# Patient Record
Sex: MALE | Race: WHITE
[De-identification: names, ages, dates, MRNs, and addresses within clinical notes are randomized per-mention and may not be internally consistent; named-entity substitution may affect disease eponyms.]

---

## 2017-10-20 ENCOUNTER — HOSPITAL ENCOUNTER (OUTPATIENT)
Dept: HOSPITAL 92 - MRI | Age: 62
Discharge: HOME | End: 2017-10-20
Attending: NEUROLOGICAL SURGERY
Payer: COMMERCIAL

## 2017-10-20 DIAGNOSIS — C71.9: Primary | ICD-10-CM

## 2017-10-20 PROCEDURE — 70553 MRI BRAIN STEM W/O & W/DYE: CPT

## 2017-10-20 PROCEDURE — A9579 GAD-BASE MR CONTRAST NOS,1ML: HCPCS

## 2017-10-20 NOTE — MRI
BRAIN MRI WITH AND WITHOUT CONTRAST:

 

COMPARISON: 

9/20/17.

 

CLINICAL HISTORY: 

Brain lab protocol.

 

FINDINGS: 

Redemonstration of pathologic enhancement involving the medial right temporal lobe measuring 2.2 cm 
AP x 1.7 cm transverse.

 

Developmental venous anomaly of the right parietooccipital region present.

 

Cavitary encephalomalacia of the anterior pole right temporal lobe is redemonstrated with adjacent d
ural-based enhancement, predominantly medially.  A component of this linear enhancement may be relat
ed to postoperative enhancement.

 

IMPRESSION: 

Pathologic enhancement, intraaxial in location involving the medial right temporal lobe compatible w
ith malignancy.  This does reside adjacent postoperative resection cavity of the anterior pole right
 temporal lobe.

 

POS: LANE

## 2018-06-27 ENCOUNTER — HOSPITAL ENCOUNTER (OUTPATIENT)
Dept: HOSPITAL 92 - SCSMRI | Age: 63
Discharge: HOME | End: 2018-06-27
Attending: INTERNAL MEDICINE
Payer: COMMERCIAL

## 2018-06-27 DIAGNOSIS — C71.2: Primary | ICD-10-CM

## 2018-06-27 PROCEDURE — 70553 MRI BRAIN STEM W/O & W/DYE: CPT

## 2018-06-27 PROCEDURE — A9579 GAD-BASE MR CONTRAST NOS,1ML: HCPCS

## 2018-06-27 NOTE — MRI
BRAIN MRI WITH AND WITHOUT CONTRAST:

 

HISTORY:

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  Six month followup.

 

COMPARISON:

Brain MRI from [facility] on 02/20/2018 and brain MRI from [facility] fro
m 10/20/2017 and 09/20/2017.

 

TECHNIQUE:

A brain MRI is performed with and without intravenous Gadolinium administration.  Multisequential, mu
ltiplanar imaging is performed.

 

FINDINGS:

Axial gradient echo sequence demonstrates hemosiderin deposition at the resection site, compatible wi
th remote blood products.  Acute hemorrhage is not appreciated.  There are stable post surgical hooker
es involving the right calvarium.  Redemonstration of fluid signal intensity just deep to the calvari
al resection, compatible with nonspecific postoperative extraaxial fluid.  There is redemonstration o
f malacic and gliotic change involving the right temporal lobe.  There is redemonstration of T2 and F
LAIR hyperintensities involving the right femoral, temporal, and parietal white matter.  Vasogenic ed
ema as well as components of gliotic change are suspected.  There is no midline shift.  No evidence o
f obstructing hydrocephalus.

 

The left cerebrum is unremarkable.

 

Post-contrast images demonstrate extensive dural enhancement at the operative site.  Additional dural
 enhancement extends along the right frontal, temporal, and parietal convexities.  The degree of dura
l enhancement has not significantly changed when compared to the outside examination performed at [facility].  There is increased enhancement involving the medial right temporal lobe.  The largest focu
s of enhancement is along the posterior-medial aspect of the right temporal lobe and abuts the right 
aspect of the marshall.  Though there is mass effect upon the right aspect of the marshall, there is no evide
nce of abnormal enhancement within the marshall.  This focus of enhancement measures 2.9 x 3.3 cm.  From 
the outside study performed at [facility], this region of enhancement measured 2 x 1.4 cm.  There is
 no evidence of mass effect upon the right aspect of the marshall at that time.  An additional smaller ar
ea of enhancement is noted along the anterior-medial aspect of the right temporal lobe.  This area of
 enhancement is also increased in size and currently measures 1.3 x 1.4 cm (previously measuring 1.1 
x 1.1 cm).

 

There is a stable developmental venous anomaly in the right occipital lobe.

 

IMPRESSION:

1.  Enlarging, enhancing masses involving the right temporal lobe, with resultant mass effect upon th
e right marshall.

 

2.  Areas of enhancement involving the dura, compatible with post surgical change/scar.

 

POS: [facility]

## 2018-08-08 ENCOUNTER — HOSPITAL ENCOUNTER (OUTPATIENT)
Dept: HOSPITAL 92 - MRI | Age: 63
Discharge: HOME | End: 2018-08-08
Attending: INTERNAL MEDICINE
Payer: COMMERCIAL

## 2018-08-08 DIAGNOSIS — C71.2: Primary | ICD-10-CM

## 2018-08-08 DIAGNOSIS — Z98.890: ICD-10-CM

## 2018-08-08 PROCEDURE — 70553 MRI BRAIN STEM W/O & W/DYE: CPT

## 2018-08-08 NOTE — MRI
PRE AND POST CONTRAST ENHANCED MRI BRAIN:

 

08/08/2018

 

HISTORY:

Brain tumor.  The patient received chemotherapy and radiation therapy followup.

 

COMPARISON:

06/27/2018

 

TECHNIQUE:

Pre and post contrast enhanced MRI of the brain were obtained.

 

FINDINGS:

Images demonstrate a previous right-sided pterional craniotomy.  There has been surgical resection of
 much of the right temporal lobe.

 

Areas of signal abnormality remain in the medial and posterior aspect of the right temporal lobe, com
patible with residual tumor.  There is definite enhancement in this area.  Overall, the MRI appearanc
e is not significantly changed.  A dural based area of enhancement remains and extends into the right
 middle cranial fossa, medially, compatible with dural based residual tumor.  Some encephalomalacic c
hanges are also seen in the residual portions of the upper right temporal lobe.  Signal abnormality a
lso remains in the deep and periventricular white matter of the right frontal and parietal lobes.

 

No definite evidence of contralateral extension is seen.

 

IMPRESSION:

Extensive surgical changes seen in the right middle cranial fossa with extensive residual neoplastic 
enhancement remaining.  MRI appearance is stable.

 

POS: [facility]

## 2018-09-20 ENCOUNTER — HOSPITAL ENCOUNTER (INPATIENT)
Dept: HOSPITAL 92 - ERS | Age: 63
LOS: 7 days | Discharge: NURSING, REHAB OR LONG-TERM CARE | DRG: 696 | End: 2018-09-27
Attending: FAMILY MEDICINE | Admitting: FAMILY MEDICINE
Payer: COMMERCIAL

## 2018-09-20 VITALS — BODY MASS INDEX: 30 KG/M2

## 2018-09-20 DIAGNOSIS — C71.9: ICD-10-CM

## 2018-09-20 DIAGNOSIS — Z92.21: ICD-10-CM

## 2018-09-20 DIAGNOSIS — K58.1: ICD-10-CM

## 2018-09-20 DIAGNOSIS — N17.9: ICD-10-CM

## 2018-09-20 DIAGNOSIS — E87.1: ICD-10-CM

## 2018-09-20 DIAGNOSIS — Z79.899: ICD-10-CM

## 2018-09-20 DIAGNOSIS — E03.9: ICD-10-CM

## 2018-09-20 DIAGNOSIS — K56.7: ICD-10-CM

## 2018-09-20 DIAGNOSIS — R53.1: ICD-10-CM

## 2018-09-20 DIAGNOSIS — I10: ICD-10-CM

## 2018-09-20 DIAGNOSIS — E78.5: ICD-10-CM

## 2018-09-20 DIAGNOSIS — Z92.3: ICD-10-CM

## 2018-09-20 DIAGNOSIS — Z66: ICD-10-CM

## 2018-09-20 DIAGNOSIS — R33.9: Primary | ICD-10-CM

## 2018-09-20 LAB
ALBUMIN SERPL BCG-MCNC: 4 G/DL (ref 3.4–4.8)
ALP SERPL-CCNC: 50 U/L (ref 40–150)
ALT SERPL W P-5'-P-CCNC: 15 U/L (ref 8–55)
ANION GAP SERPL CALC-SCNC: 16 MMOL/L (ref 10–20)
AST SERPL-CCNC: 16 U/L (ref 5–34)
BASOPHILS # BLD AUTO: 0 THOU/UL (ref 0–0.2)
BASOPHILS NFR BLD AUTO: 0.2 % (ref 0–1)
BILIRUB SERPL-MCNC: 0.7 MG/DL (ref 0.2–1.2)
BUN SERPL-MCNC: 23 MG/DL (ref 8.4–25.7)
CALCIUM SERPL-MCNC: 9.6 MG/DL (ref 7.8–10.44)
CHLORIDE SERPL-SCNC: 105 MMOL/L (ref 98–107)
CO2 SERPL-SCNC: 21 MMOL/L (ref 23–31)
CREAT CL PREDICTED SERPL C-G-VRATE: 0 ML/MIN (ref 70–130)
EOSINOPHIL # BLD AUTO: 0.8 THOU/UL (ref 0–0.7)
EOSINOPHIL NFR BLD AUTO: 15 % (ref 0–10)
GLOBULIN SER CALC-MCNC: 2.5 G/DL (ref 2.4–3.5)
GLUCOSE SERPL-MCNC: 159 MG/DL (ref 80–115)
HGB BLD-MCNC: 14.6 G/DL (ref 14–18)
LYMPHOCYTES # BLD: 1.1 THOU/UL (ref 1.2–3.4)
LYMPHOCYTES NFR BLD AUTO: 20.8 % (ref 21–51)
MCH RBC QN AUTO: 30.6 PG (ref 27–31)
MCV RBC AUTO: 93.3 FL (ref 78–98)
MONOCYTES # BLD AUTO: 0.3 THOU/UL (ref 0.11–0.59)
MONOCYTES NFR BLD AUTO: 6.9 % (ref 0–10)
NEUTROPHILS # BLD AUTO: 2.9 THOU/UL (ref 1.4–6.5)
NEUTROPHILS NFR BLD AUTO: 57.2 % (ref 42–75)
PLATELET # BLD AUTO: 290 THOU/UL (ref 130–400)
POTASSIUM SERPL-SCNC: 3.6 MMOL/L (ref 3.5–5.1)
RBC # BLD AUTO: 4.78 MILL/UL (ref 4.7–6.1)
SODIUM SERPL-SCNC: 138 MMOL/L (ref 136–145)
SP GR UR STRIP: 1.02 (ref 1–1.04)
WBC # BLD AUTO: 5 THOU/UL (ref 4.8–10.8)

## 2018-09-20 PROCEDURE — 81003 URINALYSIS AUTO W/O SCOPE: CPT

## 2018-09-20 PROCEDURE — 84481 FREE ASSAY (FT-3): CPT

## 2018-09-20 PROCEDURE — 85025 COMPLETE CBC W/AUTO DIFF WBC: CPT

## 2018-09-20 PROCEDURE — 96360 HYDRATION IV INFUSION INIT: CPT

## 2018-09-20 PROCEDURE — 80053 COMPREHEN METABOLIC PANEL: CPT

## 2018-09-20 PROCEDURE — 36416 COLLJ CAPILLARY BLOOD SPEC: CPT

## 2018-09-20 PROCEDURE — 36415 COLL VENOUS BLD VENIPUNCTURE: CPT

## 2018-09-20 PROCEDURE — 93005 ELECTROCARDIOGRAM TRACING: CPT

## 2018-09-20 PROCEDURE — 84443 ASSAY THYROID STIM HORMONE: CPT

## 2018-09-20 PROCEDURE — 84439 ASSAY OF FREE THYROXINE: CPT

## 2018-09-20 PROCEDURE — 51702 INSERT TEMP BLADDER CATH: CPT

## 2018-09-20 PROCEDURE — 83605 ASSAY OF LACTIC ACID: CPT

## 2018-09-20 PROCEDURE — 80048 BASIC METABOLIC PNL TOTAL CA: CPT

## 2018-09-20 RX ADMIN — ACETAMINOPHEN AND CODEINE PHOSPHATE PRN TAB: 300; 30 TABLET ORAL at 09:47

## 2018-09-20 RX ADMIN — ASPIRIN SCH MG: 81 TABLET ORAL at 09:49

## 2018-09-20 RX ADMIN — ACETAMINOPHEN AND CODEINE PHOSPHATE PRN TAB: 300; 30 TABLET ORAL at 18:19

## 2018-09-20 NOTE — PDOC.EVN
Event Note





- Event Note


Event Note: 





S: Paged by nursing informing patient's wife wanted to speak with the primary 

team. Upon arrival to the room, patient's wife was confused regarding the plan 

of care and overwhelmed by the acute deterioration of the patient's condition. 

The patient and his wife state that the patient has had progressive weakness of 

his left side and she states she is no longer able to help him ambulate at 

home. They also state this is his first episode of urinary retention but that 

his outpatient oncologist states this could be a potential problem. His wife 

also states she has had difficulty arranging hospice care outpatient and she is 

very frustrated about it. I discussed the care plan with the patient and his 

wife at length. 





O: 4/5 strength left leg. reynoso bag in place draining clear urine 





A/P:


- VICTOR HUGO on CKD2- will start IV LR at 100 mL/hr and will recheck BMP tomorrow 

morning


- Glioblastoma multiforme- will have CM work on arranging outpatient hospice 

care and placement in and long term care facility. 


- Deconditioning 2/2 Glioblastoma- PT/OT to evaluate for placement


- Neurogenic bladder- consult urology for recommendations on self cath vs. 

suprapubic catheter placement

## 2018-09-20 NOTE — HP
HISTORY OF PRESENT ILLNESS:  I have discussed this case with Dr. Duval and agreed with his assessme
nt and plan.

 

Briefly, Mr. Cain is an unfortunate 63-year-old man with a several year history of glioblastoma st
atus post several craniotomies and chemotherapies.  Yesterday, he developed urinary retention and als
o could not rise from the floor.  EMS was called and he was subsequently brought to the ER where cath
eterization placement revealed 150 mL of urine.  He was admitted for further observation and treatmen
t.

 

PHYSICAL EXAMINATION:

GENERAL:  This morning, the patient seems slightly lethargic and slightly confused.  He is, however, 
in no acute distress.

VITAL SIGNS:  His blood pressure is 130/80, pulse rate is 63 and regular, respirations are 18, his ro
om air saturation is 96%.

EAR, NOSE AND THROAT:  scalp changes and skull changes consistent with previous craniotomies.

NECK:  Supple.

CARDIAC:  Heart rhythm regular, no gallop or murmur.

LUNGS:  Diminished, but clear without rales or wheezes.  No distress.

ABDOMEN:  Flat and soft.

 

LABORATORY DATA:  CBC:  White count is 5,000, hemoglobin 14.6 with hematocrit of 44.6.  Chemistries: 
 Sodium 138, potassium 3.6, chloride 105, bicarbonate 21, BUN 23, creatinine 1.37.  Urinalysis negati
ve for evidence of infection.

 

ASSESSMENT AND PLAN:  Acute urinary retention, this is likely a neurogenic bladder and for now, we wi
ll just leave a Nichols catheter in place.  The patient and his wife are in discussions about palliativ
e care and hospice.

## 2018-09-20 NOTE — PDOC.FPRHP
- History of Present Illness


Chief Complaint: urinary problems


History of Present Illness: 


Pt is a 64 yo M with pmh of glioblastoma s/p craniotomy x2 and several rounds 

of chemo and brain radiation. Pt presents for evaluation of urinary retention. 

Reports over last 3 weeks he has found progressive difficulty with urination. 

Pt reports urinary frequency and continued urgency after voiding. No 

incontinence. No dysuria or hematuria. Denies prostate problems in the past. Pt 

was hypotensive with SBP in 80s and 90s on presentation. Was given bolus IVF 

and pressures normalized. Pt reports being on increased doses of 

antihypertensives due to taking avastin chemo with htn side effect. He 

discontinued this med earlier in the month but continued his current htn meds.





Glioblastoma- Pt dx in march 2016 over last 2 years has had 2 craniotomies and 

several rounds of chemo and radiation with most recent being avastin. Earlier 

this month pt and wife decided to discontinue treatment and pursue palliative 

measures. They have not decided on a hospice yet.





Weakness- also complains of generalized weakness with concentration on the L. 

Reports onset was after several treatments of radiation to R side of brain for 

glioblastoma.





ED Course: 


Reynoso cath- 850ml dark urine, UA- negative, bolus IVF








- Allergies/Adverse Reactions


 Allergies











Allergy/AdvReac Type Severity Reaction Status Date / Time


 


No Known Drug Allergies Allergy   Verified 03/15/16 03:16














- Home Medications


 











 Medication  Instructions  Recorded  Confirmed  Type


 


Atorvastatin Calcium [Lipitor] 10 mg PO DAILY 03/15/16 09/20/18 History


 


Levothyroxine Sodium 150 mcg PO DAILY 03/15/16 03/15/16 History


 


Lisinopril/Hydrochlorothiazide 2 tab PO DAILY 03/15/16 03/15/16 History





[Lisinopril-Hctz 10-12.5 mg Tab]    


 


Acetaminophen With Codeine 1 - 2 tablet PO Q4HR PRN 03/27/16 03/27/16 History





[Tylenol with Codeine #3]    


 


Dexamethasone 4 mg PO DAILY 03/27/16 03/27/16 History


 


levETIRAcetam [Keppra] 500 mg PO BID 03/27/16 03/27/16 History


 


Acetaminophen W/ Codeine [Tylenol 1 tab PO Q6HR PRN 09/20/18 09/20/18 History





#3]    


 


Aspirin [Ecotrin] 81 mg PO DAILY 09/20/18 09/20/18 History


 


Famotidine [Pepcid] 20 mg PO BID PRN 09/20/18 09/20/18 History


 


Hydrochlorothiazide 25 mg PO DAILY 09/20/18 09/20/18 History


 


Levothyroxine [Synthroid] 150 mcg PO DAILY 09/20/18 09/20/18 History














- History


PMHx: glioblastoma, hypothyroidism, htn, IBS


 


PSHx: Craniotomy x2, hernia repair





FHx: none


 


Social: occasional EtOH, denies tobacco and drugs


 








- Review of Systems


General: reports: fatigue.  denies: fever/chills


ENT: denies: nasal congestion, rhinorrhea


Respiratory: denies: cough, congestion, shortness of breath


Cardiovascular: denies: chest pain, palpitation


Gastrointestinal: reports: nausea.  denies: vomiting


Genitourinary: reports: polyuria.  denies: incontinence, dysuria


Skin: denies: rashes, lesions


Musculoskeletal: reports: pain.  denies: stiffness


Neurological: reports: weakness.  denies: syncope, seizure





- Vital signs


BP: [100/80]  HR: [90] RR: [20] Tmax: [97.9] Pox: [94]% on [RA]  Wt: [106kg]   








- Physical Exam


Constitutional: NAD, awake, alert and oriented


HEENT: EOMI, grossly normal vision, grossly normal hearing, MMM


Neck: no thyromegaly


Chest: no-tender to palpation


Heart: RRR, normal S1/S2


Lungs: CTAB, no respiratory distress, good air movement


Abdomen: soft, non-tender, other (no suprapubic tenderness)


Musculoskeletal: normal structure, normal tone


Neurological: normal sensation


Skin: no rash/lesions, good turgor


Heme/Lymphatic: no purpura, no petechia


Psychiatric: normal mood and affect, good judgment and insight


Additional comment: 





Rectal exam: normal tone, no hemorrhoids, normal prostate without enlargement 

or nodules





FMR H&P: Results





- Labs


Result Diagrams: 


 09/20/18 03:28





 09/20/18 03:28


Lab results: 


 











WBC  5.0 thou/uL (4.8-10.8)   09/20/18  03:28    


 


Hgb  14.6 g/dL (14.0-18.0)   09/20/18  03:28    


 


Hct  44.6 % (42.0-52.0)   09/20/18  03:28    


 


MCV  93.3 fL (78.0-98.0)   09/20/18  03:28    


 


Plt Count  290 thou/uL (130-400)   09/20/18  03:28    


 


Neutrophils %  57.2 % (42.0-75.0)   09/20/18  03:28    


 


Sodium  138 mmol/L (136-145)   09/20/18  03:28    


 


Potassium  3.6 mmol/L (3.5-5.1)   09/20/18  03:28    


 


Chloride  105 mmol/L ()   09/20/18  03:28    


 


Carbon Dioxide  21 mmol/L (23-31)  L  09/20/18  03:28    


 


BUN  23 mg/dL (8.4-25.7)   09/20/18  03:28    


 


Creatinine  1.37 mg/dL (0.6-1.3)  H  09/20/18  03:28    


 


Glucose  159 mg/dL ()  H  09/20/18  03:28    


 


Calcium  9.6 mg/dL (7.8-10.44)   09/20/18  03:28    


 


Total Bilirubin  0.7 mg/dL (0.2-1.2)   09/20/18  03:28    


 


AST  16 U/L (5-34)   09/20/18  03:28    


 


ALT  15 U/L (8-55)   09/20/18  03:28    


 


Alkaline Phosphatase  50 U/L ()   09/20/18  03:28    


 


Serum Total Protein  6.5 g/dL (5.8-8.1)   09/20/18  03:28    


 


Albumin  4.0 g/dL (3.4-4.8)   09/20/18  03:28    


 


Urine Ketones  Negative mg/dL (Negative)   09/20/18  03:17    


 


Urine Blood  Negative  (Negative)   09/20/18  03:17    


 


Urine Nitrite  Negative  (Negative)   09/20/18  03:17    


 


Ur Leukocyte Esterase  Negative  (Negative)   09/20/18  03:17    














FMR H&P: A/P





- Problem List


(1) Acute urinary retention


Current Visit: Yes   Status: Acute   Code(s): R33.8 - OTHER RETENTION OF URINE 

  





(2) Generalized weakness


Current Visit: Yes   Status: Acute   Code(s): R53.1 - WEAKNESS   





(3) Acute kidney injury


Current Visit: Yes   Status: Acute   Code(s): N17.9 - ACUTE KIDNEY FAILURE, 

UNSPECIFIED   





(4) Glioblastoma multiforme


Current Visit: No   Status: Acute   Code(s): C71.9 - MALIGNANT NEOPLASM OF BRAIN

, UNSPECIFIED   





(5) Hypertension


Current Visit: No   Status: Acute   Code(s): I10 - ESSENTIAL (PRIMARY) 

HYPERTENSION   





(6) Ileus


Current Visit: No   Status: Acute   Code(s): K56.7 - ILEUS, UNSPECIFIED   





- Plan


Acute Urinary Retention


- Likely 2/2 neurogenic causes from chemo/radiation/tumor. s/p reynoso placement 

with 850 ml urine output. Possible neoplastic involvement of spinal cord.


P- Lumbar MRI


-review meds for iatrogenic causes





Acute kidney injury


A- likely secondary to obstructive uropathy


P- PO hydration


-repeat BMPs





Hypotension


A- Likely 2/2 medications vs. 850ml voiding s/p urinary cath. BPs have improved 

s/p IV fluid hydration.


P- will hold antihypertensives


-monitor BPs.





Generalized weakness


A- likely 2/2 Glioblastoma/treatment of


P- PT/OT consult/treat





History of Glioblastoma.


- Pt and family discussing Palliative care/Hospice; consider formal palliative 

consult to assist with decision making.








FMR H&P: Upper Level





- Pertinent history


Angelo Cain is a 63 year old male with a history of glioblastoma multiforme 

who presents to the ED with one day history of urinary retention and several 

day history of weakness. 





Over the past two years, patient has undergone multiple treatments for GBM 

including craniotomies, chemo and radiation. Most recently, pt was started on 

Avastin from March until he self-discontinued treatment early this month.








Of note, pt was found to be hypotensive in EMS with a SBP of 80s. He was given 

200 cc NS by EMS and an additional 1L NS in the ED. BPs have not been 

hypotensive since being in the ED.  He was started on new hypotensive 

medications recently by Oncology.





- Pertinent findings





Vitals:


BP: 100/79   P: 93   RR: 20   T: 97.9   O2: 94% on RA





Physical Exam:


General:alert and oriented x 3


Heart:regular rate and rhythm, no murmurs, rubs, or gallops.


Lungs:clear to auscultation bilaterally


Neuro:CN II-XII intact grossly;No focal motor or sensory deficits; negative 

Babinski; no clonus.


Extremities:Moves all extremities well; no lower extremity peripheral edema.








Cr: 1.37 (baseline of 1.04 in 8/2018)


Ca: 9.6


Lactic acid: 1.6














- Plan


Date/Time: 09/20/18 0448








I, Meagan Mendoza, have evaluated this patient and agree with findings/plan as 

outlined by intern resident. Pertinent changes/additions are listed here.








Acute Urinary Retention


- s/p reynoso placement with 850 ml urine output.


- in patient with history of cancer, will need to consider lumber MRI to 

evaluate for neoplastic involvement of spinal cord.


- We will review pt's medications and check for possible causes.





Acute kidney injury, likely secondary to obstructive uropathy


- will continue to monitor kidney function with BMPs.





Hypotension


- BPs have improved s/p IV fluid hydration.


- will hold antihypertensives and continue to monitor BPs.





Generalized weakness


- will order PT/OT to assess further.





History of Glioblastoma.


- Pt and family discussing Palliative care/Hospice; consider formal palliative 

consult to assist with decision making.

## 2018-09-21 LAB
ANION GAP SERPL CALC-SCNC: 12 MMOL/L (ref 10–20)
BUN SERPL-MCNC: 17 MG/DL (ref 8.4–25.7)
CALCIUM SERPL-MCNC: 8.6 MG/DL (ref 7.8–10.44)
CHLORIDE SERPL-SCNC: 104 MMOL/L (ref 98–107)
CO2 SERPL-SCNC: 23 MMOL/L (ref 23–31)
CREAT CL PREDICTED SERPL C-G-VRATE: 121 ML/MIN (ref 70–130)
GLUCOSE SERPL-MCNC: 138 MG/DL (ref 80–115)
POTASSIUM SERPL-SCNC: 3.6 MMOL/L (ref 3.5–5.1)
SODIUM SERPL-SCNC: 135 MMOL/L (ref 136–145)
T4 FREE SERPL-MCNC: 1.02 NG/DL (ref 0.7–1.48)

## 2018-09-21 RX ADMIN — ACETAMINOPHEN AND CODEINE PHOSPHATE PRN TAB: 300; 30 TABLET ORAL at 20:19

## 2018-09-21 RX ADMIN — ACETAMINOPHEN AND CODEINE PHOSPHATE PRN TAB: 300; 30 TABLET ORAL at 05:27

## 2018-09-21 RX ADMIN — ASPIRIN SCH MG: 81 TABLET ORAL at 09:02

## 2018-09-21 NOTE — ADD-PRG
DATE OF SERVICE:  09/21/2018

 

This is an addendum to the note of Dr. Heaven Mi.

 

SUBJECTIVE:  Mr. Cain was seen yesterday by the Urology Service.  For now, he has elected to debbie
nue with his Nichols catheter and Flomax was added.  He can follow up with the urologist after discharg
e if he wishes a different or further treatment for his urinary retention, which is likely a permanen
t feature now.  In the event, he is otherwise resting comfortably and we are awaiting hospice and/or 
SNF placement.

## 2018-09-21 NOTE — PDOC.FM
- Subjective


Subjective: 





Still feeling constipated this morning. Otherwise no complaints. Working 

towards Hospice placement. Saw urology Dr. Vazquez yesterday. Ate and drank 

well yesterday.





- Objective


Vital Signs & Weight: 


 Vital Signs (12 hours)











  Temp Pulse Resp BP Pulse Ox


 


 09/20/18 20:00  98.5 F  81  18  107/80  94 L








 Weight











Weight                         106.254 kg














I&O: 


 











 09/19/18 09/20/18 09/21/18





 06:59 06:59 06:59


 


Intake Total   920


 


Output Total   575


 


Balance   345











Result Diagrams: 


 09/20/18 03:28





 09/21/18 03:59





Phys Exam





- Physical Examination


Constitutional: NAD


HEENT: PERRLA, sclera anicteric


Neck: supple


+nodes


Respiratory: no wheezing, clear to auscultation bilateral


Cardiovascular: RRR, no significant murmur


Gastrointestinal: soft, non-tender, positive bowel sounds


Musculoskeletal: no edema, pulses present


4/5  strength on LUE


Psychiatric: normal affect, A&O x 3


Skin: normal turgor, cap refill <2 seconds





Dx/Plan


(1) Acute kidney injury


Code(s): N17.9 - ACUTE KIDNEY FAILURE, UNSPECIFIED   Status: Resolved   





(2) Acute urinary retention


Code(s): R33.8 - OTHER RETENTION OF URINE   Status: Acute   





(3) Generalized weakness


Code(s): R53.1 - WEAKNESS   Status: Acute   





(4) Constipation


Code(s): K59.00 - CONSTIPATION, UNSPECIFIED   Status: Chronic   





(5) Glioblastoma multiforme


Code(s): C71.9 - MALIGNANT NEOPLASM OF BRAIN, UNSPECIFIED   Status: Chronic   





(6) Hypothyroid


Code(s): E03.9 - HYPOTHYROIDISM, UNSPECIFIED   Status: Chronic   





- Plan


Plan: 





62 yo M with PMH of Glioblastoma s/p craniotomy and chemotherapy presents for 

weakness and acute urinary retention.





Acute Urinary Retention


- Likely 2/2 neurogenic causes from chemo/radiation/tumor. s/p reynoso placement 

with 850 ml urine output. 


- Urology, Dr Vazquez consulted, recs appreciated 


   -recommends flomax, continue reynoso at this time, outpatient follow up





Generalized weakness


- likely 2/2 Glioblastoma or hypothyroidism/iatrogenic


- TSH was low at .036


   - Free T4 and T3 pending


- PT/OT consulted


- Hospice consulted and helping with patient placement





Hypothyroidism


-currently on 150 mcg levothyroxine


-see above





Constipation


-Add senna-doc today, consider enema if patient still unable to have BM





Acute kidney injury, resolved


- likely secondary to obstructive uropathy


- resolved with reynoso catheterization and fluid resuscitation





Hypotension


- Likely 2/2 medications vs. 850ml voiding s/p urinary cath. BPs have improved s

/p IV fluid hydration.


- continue to monitor BPs.





History of Glioblastoma.


- Pt and family desiring Hospice placement


- Hospice consulted

## 2018-09-21 NOTE — CON
DATE OF CONSULTATION:  09/20/2018

 

REASON FOR CONSULTATION:  Urinary retention.

 

REQUESTING PHYSICIAN:  Ced Sarkar M.D.

 

HISTORY OF PRESENT ILLNESS:  Mr. Cain is a 63-year-old male with history of glioblastoma status po
st several craniotomies, radiation therapy, and rounds of chemotherapy.  The patient developed progre
ssive voiding difficulty over the past 2 weeks.  He states he has had frequency and urgency of urinat
ion as well as incontinence throughout the day.  During the night, he reports over the past several w
eeks he has had to get up every hour and a half or so.  The patient yesterday developed significant l
ower abdominal discomfort and was very weak.  EMS was called and he was brought to the emergency depa
rtment where Nichols catheter was placed and the patient had a significant residual urine within his bl
adder.  The patient was admitted for further observation and treatment.

 

Currently, the patient is feeling better.  He reports his abdominal pain has improved.  No gross hema
turia.  The patient denies ever seeing urologist in the past.  No procedures on the prostate in the p
ast.  No family history of genitourinary malignancy to his knowledge.  He states that he recently has
 had conversations with his oncologist and primary care doctor that he did not desire further radiati
on or chemotherapy at this time.

 

REVIEW OF SYSTEMS:  Full 12-point review of systems was performed and is negative other than that men
tioned in the HPI.

 

PAST MEDICAL HISTORY:

1.  Glioblastoma.

2.  Hypothyroidism.

3.  Hypertension.

4.  IBS.

 

PAST SURGICAL HISTORY:

1.  Craniotomy x2.

2.  Hernia repair.

 

FAMILY HISTORY:  Noncontributory.

 

SOCIAL HISTORY:  Occasional alcohol.  No tobacco or drugs.  The patient was working in construction p
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

 

PHYSICAL EXAMINATION:

VITAL SIGNS:  Temperature is 98.1, blood pressure 121/83, pulse 74, respirations 16, oxygen saturatio
n 94% on room air.

GENERAL:  Awake, alert, in no apparent distress.

HEENT:  Normocephalic, atraumatic.

NECK:  Supple, no mass or lymphadenopathy.

CARDIOVASCULAR:  Regular rate and rhythm.

PULMONARY:  Breathing unlabored, no wheezing.

ABDOMEN:  Soft, nontender/nondistended, no masses or organomegaly, no suprapubic tenderness to palpat
ion, no CVA tenderness.

GENITOURINARY:  Nichols catheter in place, draining clear yellow urine, normal circumcised penis withou
t concerning lesion, scrotum normal, testes palpably normal bilaterally.

EXTREMITIES:  Warm and well perfused, no edema.

NEUROLOGIC:  No focal deficits.

 

LABORATORY DATA:  Sodium 138, potassium 3.6, chloride 105, bicarbonate 21, BUN 23, creatinine 1.37, g
lucose 159.

 

ASSESSMENT:  A 63-year-old male with urinary retention, stage IV glioblastoma.

 

PLAN:  I discussed the natural history of urinary retention with the patient and his son in detail.  
I discussed potential etiologies including bladder outlet obstruction from an enlarged prostate as we
ll as neurogenic bladder secondary to current brain lesions.  Recommend starting tamsulosin daily.  W
e will leave Nichols catheter in place at this time.  The patient actually desires that the Nichols lizett
ter be left indefinitely at this point.  I explained that certainly is not unreasonable given his sev
ere urinary symptoms at home prior to his presentation here as well as difficulty getting to and from
 the bathroom.  He should continue tamsulosin as an outpatient.  We will schedule the patient outpati
ent followup with Urology for further discussion of additional workup of his urinary retention versus
 leaving an indwelling Nichols catheter.

 

Thank you for allowing me to participate in the care of this patient.

## 2018-09-22 LAB
ANION GAP SERPL CALC-SCNC: 11 MMOL/L (ref 10–20)
BUN SERPL-MCNC: 11 MG/DL (ref 8.4–25.7)
CALCIUM SERPL-MCNC: 8.5 MG/DL (ref 7.8–10.44)
CHLORIDE SERPL-SCNC: 107 MMOL/L (ref 98–107)
CO2 SERPL-SCNC: 22 MMOL/L (ref 23–31)
CREAT CL PREDICTED SERPL C-G-VRATE: 140 ML/MIN (ref 70–130)
GLUCOSE SERPL-MCNC: 104 MG/DL (ref 80–115)
POTASSIUM SERPL-SCNC: 3.7 MMOL/L (ref 3.5–5.1)
SODIUM SERPL-SCNC: 136 MMOL/L (ref 136–145)

## 2018-09-22 RX ADMIN — ASPIRIN SCH MG: 81 TABLET ORAL at 08:17

## 2018-09-22 RX ADMIN — ACETAMINOPHEN AND CODEINE PHOSPHATE PRN TAB: 300; 30 TABLET ORAL at 15:51

## 2018-09-22 RX ADMIN — ACETAMINOPHEN AND CODEINE PHOSPHATE PRN TAB: 300; 30 TABLET ORAL at 08:16

## 2018-09-22 NOTE — EKG
Test Reason : 

Blood Pressure : ***/*** mmHG

Vent. Rate : 085 BPM     Atrial Rate : 085 BPM

   P-R Int : 168 ms          QRS Dur : 104 ms

    QT Int : 402 ms       P-R-T Axes : 014 -25 031 degrees

   QTc Int : 478 ms

 

Normal sinus rhythm

Inferior infarct , age undetermined

Abnormal ECG

 

Confirmed by RMAON OLIVA, ALIZE DENTON ([id number]),  FORREST ELENA ([id number]) on 9/22/2018 6:24:26 PM

 

Referred By:             Confirmed By:ALIZE NAVARRO MD

## 2018-09-22 NOTE — PDOC.FM
- Subjective


Subjective: 





Per nurse, no acute events overnight. Pt has no complaints. Had BM x1 

yesterday. Denies abdominal pain, dysuria, pain.





- Objective


MAR Reviewed: Yes


Vital Signs & Weight: 


 Vital Signs (12 hours)











  Temp Pulse Resp BP Pulse Ox


 


 09/21/18 20:25      94 L


 


 09/21/18 20:00  98.3 F  81  18  132/75  94 L








 Weight











Weight                         106.254 kg














I&O: 


 











 09/20/18 09/21/18 09/22/18





 06:59 06:59 06:59


 


Intake Total  2470 1260


 


Output Total  1675 1250


 


Balance  795 10











Result Diagrams: 


 09/20/18 03:28





 09/21/18 03:59





<Stormy Priest - Last Filed: 09/22/18 07:57>





- Objective


Vital Signs & Weight: 


 Vital Signs (12 hours)











  Temp Pulse Resp BP Pulse Ox


 


 09/22/18 07:39      94 L


 


 09/22/18 07:29  98.7 F  68  18  117/80  94 L








 Weight











Weight                         106.254 kg














I&O: 


 











 09/21/18 09/22/18 09/23/18





 06:59 06:59 06:59


 


Intake Total 2470 1260 


 


Output Total 1675 1250 


 


Balance 795 10 











Result Diagrams: 


 09/20/18 03:28





 09/22/18 07:50





<Job Renteria - Last Filed: 09/22/18 10:34>





Phys Exam





- Physical Examination


Constitutional: NAD


HEENT: moist MMs, sclera anicteric


Neck: full ROM


Respiratory: no wheezing, no rales


Cardiovascular: RRR, no significant murmur


Gastrointestinal: soft, non-tender, no distention


Musculoskeletal: pulses present


Neurological: non-focal, moves all 4 limbs


Psychiatric: normal affect, A&O x 3


Skin: normal turgor, cap refill <2 seconds





<Stormy Priest - Last Filed: 09/22/18 07:57>





Dx/Plan


(1) Acute urinary retention


Code(s): R33.8 - OTHER RETENTION OF URINE   Status: Acute   





(2) Generalized weakness


Code(s): R53.1 - WEAKNESS   Status: Acute   





(3) Acute kidney injury


Code(s): N17.9 - ACUTE KIDNEY FAILURE, UNSPECIFIED   Status: Resolved   





(4) Hypertension


Code(s): I10 - ESSENTIAL (PRIMARY) HYPERTENSION   Status: Acute   





(5) Constipation


Code(s): K59.00 - CONSTIPATION, UNSPECIFIED   Status: Chronic   





(6) Glioblastoma multiforme


Code(s): C71.9 - MALIGNANT NEOPLASM OF BRAIN, UNSPECIFIED   Status: Chronic   





(7) Hypothyroid


Code(s): E03.9 - HYPOTHYROIDISM, UNSPECIFIED   Status: Chronic   





- Plan


Plan: 








Acute urinary retention 2/2 neurogenic etiology vs. outlet obstruction


-currently resolved with reynoso and flomax


-hx of GBM s/p several rounds of chemo and radiation


-seen by Urology, recs are appreciated: rx outpt f/u for further of AUR w/u vs. 

long term indwelling reynoso





VICTOR HUGO 2/2 ARU, resolved


-trend BMP today





Constipation


-possibly neurogenic in context of new onset AUR


-able to have BM yesterday


-continue senekot





Deconditioning 2/2 GBM


-PT rx  continuation of PT and OT in hospice





Hx of Glioblastoma multiforme


-pt has decided on palliative care, awaiting placement


-social work consulted


-prn pain meds


-oncologist was notified





Subclinical hypothryoidism


-continue synthroid





HLD


-atorvastatin, ASA





dvt ppx: lovenox


gi ppx: famotidine








dispo: pending hospice placement





<Stormy Priest - Last Filed: 09/22/18 07:57>





Attending Addendum





- Attending Addendum


Date/Time: 09/22/18 1033





I personally evaluated the patient and discussed the management with Dr. Priest


I agree with the History, Examination, Assessment and Plan documented above 

with any addition or exceptions noted below. Care plan discussed with patient 

and family. Blood pressure stable off rx at this time appreciate Urology 

recommendations.








<Job Renteria - Last Filed: 09/22/18 10:34>

## 2018-09-23 RX ADMIN — ACETAMINOPHEN AND CODEINE PHOSPHATE PRN TAB: 300; 30 TABLET ORAL at 20:04

## 2018-09-23 RX ADMIN — ACETAMINOPHEN AND CODEINE PHOSPHATE PRN TAB: 300; 30 TABLET ORAL at 10:56

## 2018-09-23 RX ADMIN — ASPIRIN SCH MG: 81 TABLET ORAL at 08:01

## 2018-09-23 NOTE — PDOC.FM
- Subjective


Subjective: 





No acute events overnight. Yesterday was having bladder fullness but adjusted 

reynoso which resolved. Currently denies dysuria, issues voiding, fevers, chills. 

No complaints at this time. 





- Objective


MAR Reviewed: Yes


Vital Signs & Weight: 


 Vital Signs (12 hours)











  Temp Pulse Resp BP Pulse Ox


 


 09/22/18 20:00  98.1 F  81  20  138/85  96


 


 09/22/18 19:33      94 L








 Weight











Weight                         106.254 kg














I&O: 


 











 09/21/18 09/22/18 09/23/18





 06:59 06:59 06:59


 


Intake Total 2470 1260 600


 


Output Total 1675 1250 950


 


Balance 795 10 -350











Result Diagrams: 


 09/20/18 03:28





 09/22/18 07:50





<Stormy Priest - Last Filed: 09/23/18 07:54>





- Objective


Vital Signs & Weight: 


 Vital Signs (12 hours)











  Temp Pulse Resp BP Pulse Ox


 


 09/23/18 08:01      96


 


 09/23/18 08:00  97.8 F  74  16  139/87  96








 Weight











Weight                         106.254 kg














I&O: 


 











 09/22/18 09/23/18 09/24/18





 06:59 06:59 06:59


 


Intake Total 1260 600 


 


Output Total 1250 950 


 


Balance 10 -350 











Result Diagrams: 


 09/20/18 03:28





 09/22/18 07:50





<Job Renteria - Last Filed: 09/23/18 11:57>





Phys Exam





- Physical Examination


Constitutional: NAD


HEENT: moist MMs, sclera anicteric


Respiratory: no wheezing, clear to auscultation bilateral


Cardiovascular: RRR, no significant murmur


Gastrointestinal: soft, non-tender, no distention


Musculoskeletal: no edema, pulses present


Neurological: non-focal


decreased motor in LUE AND LLE


Psychiatric: normal affect, A&O x 3


Skin: normal turgor, cap refill <2 seconds





<Stormy Priest - Last Filed: 09/23/18 07:54>





Dx/Plan


(1) Acute urinary retention


Code(s): R33.8 - OTHER RETENTION OF URINE   Status: Acute   





(2) Generalized weakness


Code(s): R53.1 - WEAKNESS   Status: Acute   





(3) Acute kidney injury


Code(s): N17.9 - ACUTE KIDNEY FAILURE, UNSPECIFIED   Status: Resolved   





(4) Hypertension


Code(s): I10 - ESSENTIAL (PRIMARY) HYPERTENSION   Status: Acute   





(5) Constipation


Code(s): K59.00 - CONSTIPATION, UNSPECIFIED   Status: Chronic   





(6) Glioblastoma multiforme


Code(s): C71.9 - MALIGNANT NEOPLASM OF BRAIN, UNSPECIFIED   Status: Chronic   





(7) Hypothyroid


Code(s): E03.9 - HYPOTHYROIDISM, UNSPECIFIED   Status: Chronic   





- Plan


Plan: 








Acute urinary retention 2/2 neurogenic etiology vs. outlet obstruction


-currently resolved with reynoso and flomax


-hx of GBM s/p several rounds of chemo and radiation


-seen by urology: pt elected to keep reynoso with addition of flomax. urinary 

retention likely a chronic feature now. can f/u outpt uro if desires further 

workup or change in mgmt. 





VICTOR HUGO 2/2 ARU, resolved


-BMP from 9/22: gfr >90, creatinine at baseline at <1


-I/O, negative balance due to dec po intake. encouraged po intake. pt agreed to 

drink at least 8-10 8oz glasses today. If continues to experience negative 

fluid balance, consider restarting on mIVF





Constipation


-possibly neurogenic in context of new onset AUR


-able to have BM yesterday


-added senekot





Deconditioning 2/2 GBM


-PT rx continuation of PT and OT in hospice





Hx of Glioblastoma multiforme


-pt has decided on palliative care, awaiting placement


-social work consulted


-prn pain meds


-oncologist was notified





Subclinical hypothryoidism


-continue synthroid





HLD


-atorvastatin, ASA





dvt ppx: lovenox


gi ppx: famotidine








dispo: pending approval at [facility]





<Stormy Priest - Last Filed: 09/23/18 07:54>





Attending Addendum





- Attending Addendum


Date/Time: 09/23/18 8164





I personally evaluated the patient and discussed the management with Dr. Priest


I agree with the History, Examination, Assessment and Plan documented above 

with any addition or exceptions noted below.Patient to start transition to 

palliative care he is stable for transfer to hospice bed and patient and family 

counseled.








<Job Renteria - Last Filed: 09/23/18 11:57>

## 2018-09-24 RX ADMIN — ASPIRIN SCH MG: 81 TABLET ORAL at 08:05

## 2018-09-24 RX ADMIN — ACETAMINOPHEN AND CODEINE PHOSPHATE PRN TAB: 300; 30 TABLET ORAL at 12:13

## 2018-09-24 RX ADMIN — ACETAMINOPHEN AND CODEINE PHOSPHATE PRN TAB: 300; 30 TABLET ORAL at 18:08

## 2018-09-24 NOTE — PDOC.FM
- Subjective


Subjective: 


No overnight events. Pt reports vomiting but nausea is improved with Zofran. 

Denies pain, chest pain, SOB. No other concerns. 





- Objective


MAR Reviewed: Yes


Vital Signs & Weight: 


 Vital Signs (12 hours)











  Temp Pulse Resp BP Pulse Ox


 


 09/23/18 20:00      96


 


 09/23/18 19:27  97.8 F  71  20  140/89  71 L








 Weight











Weight                         106.254 kg














I&O: 


 











 09/22/18 09/23/18 09/24/18





 06:59 06:59 06:59


 


Intake Total 1260 600 480


 


Output Total 7143 483 7450


 


Balance 10 -350 -1920











Result Diagrams: 


 09/20/18 03:28





 09/22/18 07:50





<Cherie Celaya - Last Filed: 09/24/18 09:24>





- Objective


Vital Signs & Weight: 


 Vital Signs (12 hours)











  Temp Pulse Resp BP Pulse Ox


 


 09/24/18 08:16  98.3 F  73  20  135/94 H  95








 Weight











Weight                         106.254 kg














I&O: 


 











 09/23/18 09/24/18 09/25/18





 06:59 06:59 06:59


 


Intake Total 600 980 


 


Output Total 950 3700 


 


Balance -350 -2720 











Result Diagrams: 


 09/20/18 03:28





 09/22/18 07:50





<Jen Barrios - Last Filed: 09/24/18 17:19>





Phys Exam





- Physical Examination


Constitutional: NAD


Neck: supple


Respiratory: no wheezing, clear to auscultation bilateral


Cardiovascular: RRR, no significant murmur


Gastrointestinal: soft, non-tender, positive bowel sounds


4/5 weakness of left UE


Psychiatric: normal affect, A&O x 3





<Cherie Celaya - Last Filed: 09/24/18 09:24>





Dx/Plan


(1) Acute urinary retention


Code(s): R33.8 - OTHER RETENTION OF URINE   Status: Acute   





(2) Generalized weakness


Code(s): R53.1 - WEAKNESS   Status: Acute   





(3) Hypertension


Code(s): I10 - ESSENTIAL (PRIMARY) HYPERTENSION   Status: Acute   





(4) Hyponatremia


Code(s): E87.1 - HYPO-OSMOLALITY AND HYPONATREMIA   Status: Acute   





(5) Ileus


Code(s): K56.7 - ILEUS, UNSPECIFIED   Status: Acute   





(6) Constipation


Code(s): K59.00 - CONSTIPATION, UNSPECIFIED   Status: Chronic   





(7) Glioblastoma multiforme


Code(s): C71.9 - MALIGNANT NEOPLASM OF BRAIN, UNSPECIFIED   Status: Chronic   





(8) Hypothyroid


Code(s): E03.9 - HYPOTHYROIDISM, UNSPECIFIED   Status: Chronic   





- Plan


Plan: 


Acute urinary retention 2/2 neurogenic etiology vs. outlet obstruction


- Currently resolved with reynoso and flomax


- hx of GBM s/p several rounds of chemo and radiation


- Seen by urology: pt elected to keep reynoso with addition of flomax. urinary 

retention likely a chronic feature now. 


- Can f/u outpt uro if desires further workup or change in mgmt. 





HTN


- Currently controlled with home lisinopril- HCTZ held





Constipation


- Possibly neurogenic in context of new onset AUR


- Continue colace, senokot





Deconditioning 2/2 GBM


- PT recommends continuation of PT/OT in hospice





Hx of Glioblastoma multiforme


- Pt has decided on hospice care, awaiting placement


- Social work consulted


- PRN pain meds


- Oncologist was notified





Subclinical hypothryoidism


- Continue synthroid





HLD


- Continue home Atorvastatin, ASA


- Consider stopping Atorvastatin, studies show pts with cancer live longer in 

hospice when taken off Statins





VICTOR HUGO 2/2 ARU, resolved


- BMP from 9/22: gfr >90, creatinine at baseline at <1


- I/O, negative balance due to dec po intake. encouraged po intake. pt agreed 

to drink at least 8-10 8oz glasses today. If continues to experience negative 

fluid balance, consider restarting on mIVF





DVT ppx: lovenox


GI ppx: famotidine


Code Status: DNR





Dispo: pending placement, [facility] with [facility]








<Cherie Celaya - Last Filed: 09/24/18 09:24>





Attending Addendum





- Attending Addendum


Date/Time: 09/24/18 7858





I personally evaluated the patient and discussed the management with Dr. Celaya.


I agree with the History, Examination, Assessment and Plan documented above 

with any addition or exceptions noted below.


The patient's wife was concerned about removing the patient's reynoso.  Urology 

notes indicated pt wanted to keep the reynoso.  Will help family discuss with 

urology.  Waiting on placement.





<Jen Barrios - Last Filed: 09/24/18 17:19>

## 2018-09-25 RX ADMIN — ASPIRIN SCH MG: 81 TABLET ORAL at 10:00

## 2018-09-25 RX ADMIN — ACETAMINOPHEN AND CODEINE PHOSPHATE PRN TAB: 300; 30 TABLET ORAL at 22:19

## 2018-09-25 RX ADMIN — ACETAMINOPHEN AND CODEINE PHOSPHATE PRN TAB: 300; 30 TABLET ORAL at 12:15

## 2018-09-25 NOTE — PDOC.FM
- Subjective


Subjective: 


No overnight events. Denies pain, nausea/vomiting, chest pain, SOB. Reports 

there are issues with his insurance and the nursing facility he had planned to 

be discharged to. After speaking with Urologist on the phone yesterday feels 

comfortable with decision to leave with reynoso catheter. No other concerns. 





- Objective


MAR Reviewed: Yes


Vital Signs & Weight: 


 Vital Signs (12 hours)











  Temp Pulse Resp BP Pulse Ox


 


 09/24/18 20:00  98.4 F  76  18  132/90  94 L








 Weight











Weight                         106.254 kg














I&O: 


 











 09/23/18 09/24/18 09/25/18





 06:59 06:59 06:59


 


Intake Total 


 


Output Total 950 3700 2100


 


Balance -350 -2872 -1100











Result Diagrams: 


 09/20/18 03:28





 09/22/18 07:50





<Cherie Celaya - Last Filed: 09/25/18 08:53>





- Objective


Vital Signs & Weight: 


 Vital Signs (12 hours)











  Temp Pulse Resp BP Pulse Ox


 


 09/26/18 09:09      96


 


 09/26/18 08:00  98.4 F  73  16  136/85  96








 Weight











Weight                         106.254 kg














I&O: 


 











 09/25/18 09/26/18 09/27/18





 06:59 06:59 06:59


 


Intake Total 1000 960 


 


Output Total 2100 1900 2000


 


Balance -1100 -220 -2000











Result Diagrams: 


 09/20/18 03:28





 09/22/18 07:50





<Jen Barrios - Last Filed: 09/26/18 17:17>





Phys Exam





- Physical Examination


Constitutional: NAD


Neck: supple


Cardiovascular: RRR, no significant murmur


Gastrointestinal: soft, non-tender, positive bowel sounds


Musculoskeletal: pulses present


Psychiatric: normal affect, A&O x 3





<Cherie Celaya - Last Filed: 09/25/18 08:53>





Dx/Plan


(1) Acute urinary retention


Code(s): R33.8 - OTHER RETENTION OF URINE   Status: Acute   





(2) Generalized weakness


Code(s): R53.1 - WEAKNESS   Status: Acute   





(3) Hypertension


Code(s): I10 - ESSENTIAL (PRIMARY) HYPERTENSION   Status: Acute   





(4) Hyponatremia


Code(s): E87.1 - HYPO-OSMOLALITY AND HYPONATREMIA   Status: Acute   





(5) Ileus


Code(s): K56.7 - ILEUS, UNSPECIFIED   Status: Acute   





(6) Constipation


Code(s): K59.00 - CONSTIPATION, UNSPECIFIED   Status: Chronic   





(7) Glioblastoma multiforme


Code(s): C71.9 - MALIGNANT NEOPLASM OF BRAIN, UNSPECIFIED   Status: Chronic   





(8) Hypothyroid


Code(s): E03.9 - HYPOTHYROIDISM, UNSPECIFIED   Status: Chronic   





- Plan


Plan: 


Acute urinary retention 2/2 neurogenic etiology vs. outlet obstruction


- Currently resolved with reynoso and flomax


- hx of GBM s/p several rounds of chemo and radiation


- Seen by urology: pt elected to keep reynoso with addition of flomax. 


- Urology will stop by to speak with wife today and discuss plan/goals of care 

in regards to leaving reynoso in at discharge, will need to f/u outpt





HTN


- Currently controlled with home lisinopril- HCTZ held





Constipation


- Possibly neurogenic in context of new onset AUR


- Continue colace, senokot





Deconditioning 2/2 GBM


- PT recommends continuation of PT/OT in hospice





Hx of Glioblastoma multiforme


- Pt has decided on hospice care, awaiting placement


- Social work consulted


- PRN pain meds


- Oncologist was notified





Subclinical hypothryoidism


- Continue synthroid





HLD


- Continue home Atorvastatin, ASA


- Consider stopping Atorvastatin, studies show pts with cancer live longer in 

hospice when taken off Statins





VICTOR HUGO 2/2 ARU, resolved


- BMP from 9/22: gfr >90, creatinine at baseline at <1


- I/O, negative balance due to dec po intake. encouraged po intake. pt agreed 

to drink at least 8-10 8oz glasses today. If continues to experience negative 

fluid balance, consider restarting on mIVF





DVT ppx: lovenox


GI ppx: famotidine


Code Status: DNR





Dispo: pending placement, [facility] with [facility]








<Cherie Celaya - Last Filed: 09/25/18 08:53>





Attending Addendum





- Attending Addendum


Date/Time: 09/25/18 1116





I personally evaluated the patient and discussed the management with Dr. Celaya.


I agree with the History, Examination, Assessment and Plan documented above 

with any addition or exceptions noted below.


No family was in the room with the patient during my visit.  He expressed 

wishes for hospice but that may not be feasable at this time with pt's 

finances.  Will work with case mgmt for discharge planning.





<Jen Barrios - Last Filed: 09/26/18 17:17>

## 2018-09-26 RX ADMIN — ASPIRIN SCH MG: 81 TABLET ORAL at 08:59

## 2018-09-26 RX ADMIN — ACETAMINOPHEN AND CODEINE PHOSPHATE PRN TAB: 300; 30 TABLET ORAL at 19:34

## 2018-09-26 RX ADMIN — ACETAMINOPHEN AND CODEINE PHOSPHATE PRN TAB: 300; 30 TABLET ORAL at 10:22

## 2018-09-26 NOTE — PDOC.FM
- Objective


MAR Reviewed: Yes


Vital Signs & Weight: 


 Vital Signs (12 hours)











  Temp Pulse Resp BP Pulse Ox


 


 09/25/18 20:03  98.0 F  83  17  133/90  94 L


 


 09/25/18 20:00      94 L








 Weight











Weight                         106.254 kg














I&O: 


 











 09/24/18 09/25/18 09/26/18





 06:59 06:59 06:59


 


Intake Total 980 1000 960


 


Output Total 3700 2100 1900


 


Balance -0750 -1100 940











Result Diagrams: 


 09/20/18 03:28





 09/22/18 07:50





<Cherie Celaya - Last Filed: 09/26/18 05:55>





- Subjective


Subjective: 


The patient had no events overnight.  Waiting on SNF approval at the [facility].








- Objective


Vital Signs & Weight: 


 Vital Signs (12 hours)











  Temp Pulse Resp BP Pulse Ox


 


 09/26/18 09:09      96


 


 09/26/18 08:00  98.4 F  73  16  136/85  96








 Weight











Weight                         106.254 kg














I&O: 


 











 09/25/18 09/26/18 09/27/18





 06:59 06:59 06:59


 


Intake Total 1000 960 


 


Output Total 2100 1900 2000


 


Balance -1100 940 -2000











Result Diagrams: 


 09/20/18 03:28





 09/22/18 07:50





<Jen Barrios - Last Filed: 09/26/18 17:22>





Dx/Plan


(1) Acute urinary retention


Code(s): R33.8 - OTHER RETENTION OF URINE   Status: Acute   





(2) Generalized weakness


Code(s): R53.1 - WEAKNESS   Status: Acute   





(3) Hypertension


Code(s): I10 - ESSENTIAL (PRIMARY) HYPERTENSION   Status: Acute   





(4) Hyponatremia


Code(s): E87.1 - HYPO-OSMOLALITY AND HYPONATREMIA   Status: Acute   





(5) Ileus


Code(s): K56.7 - ILEUS, UNSPECIFIED   Status: Acute   





(6) Constipation


Code(s): K59.00 - CONSTIPATION, UNSPECIFIED   Status: Chronic   





(7) Glioblastoma multiforme


Code(s): C71.9 - MALIGNANT NEOPLASM OF BRAIN, UNSPECIFIED   Status: Chronic   





(8) Hypothyroid


Code(s): E03.9 - HYPOTHYROIDISM, UNSPECIFIED   Status: Chronic   





- Plan


Plan: 


Acute urinary retention 2/2 neurogenic etiology vs. outlet obstruction


- Currently resolved with reynoso and flomax


- hx of GBM s/p several rounds of chemo and radiation


- Seen by urology: pt elected to keep reynoso with addition of flomax. 





HTN


- Currently controlled with home lisinopril- HCTZ held





Constipation


- Possibly neurogenic in context of new onset AUR


- Continue colace, senokot





Deconditioning 2/2 GBM


- PT recommends continuation of PT/OT in hospice





Hx of Glioblastoma multiforme


- Pt has decided on hospice care, awaiting placement


- Social work consulted


- PRN pain meds


- Oncologist was notified





Subclinical hypothryoidism


- Continue synthroid





HLD


- Continue home Atorvastatin, ASA


- Consider stopping Atorvastatin, studies show pts with cancer live longer in 

hospice when taken off Statins





VICTOR HUGO 2/2 ARU, resolved


- BMP from 9/22: gfr >90, creatinine at baseline at <1


- I/O, negative balance due to dec po intake. encouraged po intake. pt agreed 

to drink at least 8-10 8oz glasses today. If continues to experience negative 

fluid balance, consider restarting on mIVF





DVT ppx: lovenox


GI ppx: famotidine


Code Status: DNR





Dispo: pending placement








<Cherie Celaya - Last Filed: 09/26/18 05:55>





Attending Addendum





- Attending Addendum


Date/Time: 09/26/18 1721





I personally evaluated the patient and discussed the management with Dr. Celaya.


I agree with the History, Examination, Assessment and Plan documented above 

with any addition or exceptions noted below.


Pt is stable.  Waiting on placemet.  Continue supportive care.





<Jen Barrios - Last Filed: 09/26/18 17:22>

## 2018-09-27 VITALS — TEMPERATURE: 98.1 F | DIASTOLIC BLOOD PRESSURE: 92 MMHG | SYSTOLIC BLOOD PRESSURE: 155 MMHG

## 2018-09-27 RX ADMIN — ASPIRIN SCH MG: 81 TABLET ORAL at 08:08

## 2018-09-27 RX ADMIN — ACETAMINOPHEN AND CODEINE PHOSPHATE PRN TAB: 300; 30 TABLET ORAL at 05:28

## 2018-09-27 RX ADMIN — ACETAMINOPHEN AND CODEINE PHOSPHATE PRN TAB: 300; 30 TABLET ORAL at 11:42

## 2018-09-27 NOTE — RAD
LEFT SHOULDER RADIOGRAPHS 3 VIEWS:

 

DATE: 9/27/18.

 

PROVIDED CLINICAL HISTORY: 

Shoulder pain.

 

FINDINGS: 

No evidence for a fracture.  The glenohumeral relationship appears grossly normal.  Subacromial space
 appears preserved.  Visualized left lung field appears clear.

 

IMPRESSION: 

No evidence for an acute osseous abnormality.  If there is persistent clinical concern, conservative 
management and followup imaging are advised.

 

POS: CYNDEE

## 2018-09-27 NOTE — PDOC.FM
Addendum entered and electronically signed by Cherie Celaya MD  09/27/18 11:16

: 





Plan: 


Acute urinary retention 2/2 neurogenic etiology vs. outlet obstruction


- Currently resolved with reynoso and flomax


- hx of GBM s/p several rounds of chemo and radiation


- Seen by urology: pt elected to keep reynoso with addition of flomax. 





HTN


- Currently controlled with home lisinopril- HCTZ held





Constipation


- Possibly neurogenic in context of new onset AUR


- Continue colace, senokot





Deconditioning 2/2 GBM


- PT recommends continuation of PT/OT, has been showing improvement





Hx of Glioblastoma multiforme


- Pt is making progress with PT/OT and pt and family have decided on SNF 

placemnt


- Social work consulted


- PRN pain meds


- Oncologist was notified





Subclinical hypothryoidism


- Continue synthroid





HLD


- Continue home Atorvastatin, ASA


- Consider stopping Atorvastatin, studies show pts with cancer live longer in 

hospice when taken off Statins





[facility] 2/2 ARU, resolved


- BMP from 9/22: gfr >90, creatinine at baseline at <1


- I/O, negative balance due to dec po intake. encouraged po intake. pt agreed 

to drink at least 8-10 8oz glasses today. If continues to experience negative 

fluid balance, consider restarting on mIVF





DVT ppx: lovenox


GI ppx: famotidine


Code Status: DNR





Dispo: pending placement











Original Note:








- Subjective


Subjective: 


The patient had no events overnight.  Waiting on SNF approval at the [place].








- Objective


MAR Reviewed: Yes


Vital Signs & Weight: 


 Vital Signs (12 hours)











  Temp Pulse Resp BP Pulse Ox


 


 09/26/18 20:00  98.4 F  73  18  137/84  95








 Weight











Weight                         106.254 kg














I&O: 


 











 09/25/18 09/26/18 09/27/18





 06:59 06:59 06:59


 


Intake Total 1000 960 960


 


Output Total 2100 1900 3900


 


Balance -0407 -851 -4775











Result Diagrams: 


 09/20/18 03:28





 09/22/18 07:50





<Cherie Celaya - Last Filed: 09/27/18 10:04>





- Objective


Vital Signs & Weight: 


 Vital Signs (12 hours)











  Temp Pulse Resp BP Pulse Ox


 


 09/27/18 08:05  98.1 F  72  20  155/92 H  100


 


 09/27/18 08:00      100








 Weight











Weight                         106.254 kg














I&O: 


 











 09/26/18 09/27/18 09/28/18





 06:59 06:59 06:59


 


Intake Total 960 960 420


 


Output Total 1900 3900 


 


Balance -940 -2940 420











Result Diagrams: 


 09/20/18 03:28





 09/22/18 07:50





<Jen Barrios - Last Filed: 09/27/18 15:36>





Phys Exam





- Physical Examination


Constitutional: NAD


Respiratory: no wheezing, clear to auscultation bilateral


Cardiovascular: RRR, no significant murmur


Psychiatric: normal affect, A&O x 3





<Cherie Celaya - Last Filed: 09/27/18 10:04>





Dx/Plan


(1) Acute urinary retention


Code(s): R33.8 - OTHER RETENTION OF URINE   Status: Acute   





(2) Generalized weakness


Code(s): R53.1 - WEAKNESS   Status: Acute   





(3) Hypertension


Code(s): I10 - ESSENTIAL (PRIMARY) HYPERTENSION   Status: Acute   





(4) Hyponatremia


Code(s): E87.1 - HYPO-OSMOLALITY AND HYPONATREMIA   Status: Acute   





(5) Ileus


Code(s): K56.7 - ILEUS, UNSPECIFIED   Status: Acute   





(6) Constipation


Code(s): K59.00 - CONSTIPATION, UNSPECIFIED   Status: Chronic   





(7) Glioblastoma multiforme


Code(s): C71.9 - MALIGNANT NEOPLASM OF BRAIN, UNSPECIFIED   Status: Chronic   





(8) Hypothyroid


Code(s): E03.9 - HYPOTHYROIDISM, UNSPECIFIED   Status: Chronic   





- Plan


Plan: 


Acute urinary retention 2/2 neurogenic etiology vs. outlet obstruction


- Currently resolved with reynoso and flomax


- hx of GBM s/p several rounds of chemo and radiation


- Seen by urology: pt elected to keep reynoso with addition of flomax. 





HTN


- Currently controlled with home lisinopril- HCTZ held





Constipation


- Possibly neurogenic in context of new onset AUR


- Continue colace, senokot





Deconditioning 2/2 GBM


- PT recommends continuation of PT/OT in hospice





Hx of Glioblastoma multiforme


- Pt has decided on hospice care, awaiting placement


- Social work consulted


- PRN pain meds


- Oncologist was notified





Subclinical hypothryoidism


- Continue synthroid





HLD


- Continue home Atorvastatin, ASA


- Consider stopping Atorvastatin, studies show pts with cancer live longer in 

hospice when taken off Statins





[facility] 2/2 ARU, resolved


- BMP from 9/22: gfr >90, creatinine at baseline at <1


- I/O, negative balance due to dec po intake. encouraged po intake. pt agreed 

to drink at least 8-10 8oz glasses today. If continues to experience negative 

fluid balance, consider restarting on mIVF





DVT ppx: lovenox


GI ppx: famotidine


Code Status: DNR





Dispo: pending placement








<Cherie Celaya - Last Filed: 09/27/18 10:04>





Attending Addendum





- Attending Addendum


Date/Time: 09/27/18 5921





I personally evaluated the patient and discussed the management with Dr. Celaya.


I agree with the History, Examination, Assessment and Plan documented above 

with any addition or exceptions noted below.


The patient's wife states he has had shoulder pain for days but the patient has 

denied pain each morning and this is the first morning that we have been told 

about it.  Will get shoulder Xr on the right.  He has had pain for over a month 

and it makes it difficult to manuever in bed.  The patient has shown 

improvement with ambulation with physical therapy and they would like to 

proceed with skilled nursing placement to further improve strength.  Dr. Celaya will examine shoulder after the XR.





<Jen Barrios - Last Filed: 09/27/18 15:36>

## 2018-09-27 NOTE — ULT
LEFT UPPER EXTREMITY VENOUS DOPPLER:

9/27/18

 

PROVIDED CLINICAL HISTORY:  

Left arm edema. 

 

FINDINGS:  

Gray scale and color doppler sonography with spectral analysis was performed of the left internal jug
ular, subclavian, axillary, basilic, brachial, cephalic, radial and ulnar veins, demonstrating a norm
al sonographic appearance to each. 

 

IMPRESSION: 

No sonographic evidence for left upper extremity venous thrombosis.

 

POS: LANE

## 2018-09-28 NOTE — DIS-2
DATE OF ADMISSION:  09/20/2018

 

DATE OF DISCHARGE:  09/27/2018

 

RESIDENT:  Cherie Celaya, PGY1.

 

ADMITTING ATTENDING:  Dr. Joao Adams.

 

DISCHARGE ATTENDING:  Dr. Jen Barrios.

 

CONSULTATIONS:  Urology.

 

PROCEDURES:

1.  Shoulder x-ray no acute abnormalities.

2.  Vascular ultrasound, no evidence of upper extremity deep venous thrombosis.

 

PRIMARY DIAGNOSIS:  Acute urinary retention.

 

SECONDARY DIAGNOSES:

1.  Glioblastoma.

2.  Hypertension.

3.  Constipation.

4.  Deconditioning.

5.  Subclinical hypothyroidism.

6.  Hyperlipidemia.

7.  Acute kidney injury, resolved.

 

DISCHARGE MEDICATIONS:

1.  Tylenol No.3. q.6 hours p.r.n.

2.  Aspirin 81 mg daily.

3.  Atorvastatin 10 mg daily.

4.  Colace 100 mg b.i.d.

5.  Pepcid 20 mg b.i.d.

6.  Ibuprofen 400 mg q.4 hours for p.r.n.

7.  Levothyroxine 150 mcg daily.

8.  Lisinopril and hydrochlorothiazide 10 mg/12.5 mg two tabs p.o. daily.

9.  Zofran 8 mg q.8 hours p.r.n.

10.  Flomax 0.4 mg at bedtime.

 

DISCONTINUED MEDICATIONS:  None.

 

HISTORY OF PRESENT ILLNESS AND HOSPITAL COURSE:  Mr. Cain is a 63-year-old male with a past medica
l history of glioblastoma status post craniectomy x2 with several rounds of chemo and brain radiation
, presenting with urinary retention, with a long discussion with Urology.  He has decided to start an
d continue Flomax daily and leave catheter in for comfort care.  The urinary retention was thought to
 be related to glioblastoma versus outlet obstruction.  A voiding trial was not done to see if the Fl
omax was effective.  Constipation was treated with Colace and Senokot.  Deconditioning.  PT evaluated
 patient for deconditioning and recommend continuation of PT, OT.  Patient showed with improvement th
roughout the course of the stay.  For his hypertension, hyperlipidemia, and hypothyroidism, these wer
e controlled with his home medications.  He also had an acute kidney injury that resolved with fluids
.

 

DISPOSITION:  Stable.

 

DISCHARGE INSTRUCTIONS:

1.  Location:  SNF.

2.  Diet:  Regular.

3.  Activity:  No restrictions.

4.  Follow up with PCP within 3-7 days.